# Patient Record
Sex: FEMALE | Race: WHITE | Employment: FULL TIME | URBAN - METROPOLITAN AREA
[De-identification: names, ages, dates, MRNs, and addresses within clinical notes are randomized per-mention and may not be internally consistent; named-entity substitution may affect disease eponyms.]

---

## 2023-12-28 ENCOUNTER — HOSPITAL ENCOUNTER (EMERGENCY)
Facility: HOSPITAL | Age: 33
Discharge: HOME | End: 2023-12-28
Attending: EMERGENCY MEDICINE
Payer: COMMERCIAL

## 2023-12-28 VITALS
HEIGHT: 64 IN | DIASTOLIC BLOOD PRESSURE: 104 MMHG | WEIGHT: 257.28 LBS | OXYGEN SATURATION: 96 % | TEMPERATURE: 97.9 F | SYSTOLIC BLOOD PRESSURE: 174 MMHG | BODY MASS INDEX: 43.92 KG/M2 | RESPIRATION RATE: 18 BRPM | HEART RATE: 73 BPM

## 2023-12-28 DIAGNOSIS — M54.42 ACUTE LEFT-SIDED LOW BACK PAIN WITH LEFT-SIDED SCIATICA: Primary | ICD-10-CM

## 2023-12-28 PROCEDURE — 96372 THER/PROPH/DIAG INJ SC/IM: CPT

## 2023-12-28 PROCEDURE — 6370000000 HC RX 637 (ALT 250 FOR IP): Performed by: EMERGENCY MEDICINE

## 2023-12-28 PROCEDURE — 6360000002 HC RX W HCPCS: Performed by: EMERGENCY MEDICINE

## 2023-12-28 PROCEDURE — 99284 EMERGENCY DEPT VISIT MOD MDM: CPT

## 2023-12-28 RX ORDER — METHOCARBAMOL 750 MG/1
750 TABLET, FILM COATED ORAL 4 TIMES DAILY
Qty: 20 TABLET | Refills: 0 | Status: SHIPPED | OUTPATIENT
Start: 2023-12-28

## 2023-12-28 RX ORDER — LIDOCAINE 4 G/G
1 PATCH TOPICAL DAILY
Qty: 15 PATCH | Refills: 0 | Status: SHIPPED | OUTPATIENT
Start: 2023-12-28

## 2023-12-28 RX ORDER — NAPROXEN 500 MG/1
500 TABLET ORAL 2 TIMES DAILY
Qty: 20 TABLET | Refills: 0 | Status: SHIPPED | OUTPATIENT
Start: 2023-12-28

## 2023-12-28 RX ORDER — LIDOCAINE 4 G/G
1 PATCH TOPICAL
Status: DISCONTINUED | OUTPATIENT
Start: 2023-12-28 | End: 2023-12-28 | Stop reason: HOSPADM

## 2023-12-28 RX ORDER — KETOROLAC TROMETHAMINE 30 MG/ML
30 INJECTION, SOLUTION INTRAMUSCULAR; INTRAVENOUS
Status: COMPLETED | OUTPATIENT
Start: 2023-12-28 | End: 2023-12-28

## 2023-12-28 RX ORDER — CYCLOBENZAPRINE HCL 10 MG
10 TABLET ORAL
Status: COMPLETED | OUTPATIENT
Start: 2023-12-28 | End: 2023-12-28

## 2023-12-28 RX ADMIN — CYCLOBENZAPRINE 10 MG: 10 TABLET, FILM COATED ORAL at 19:11

## 2023-12-28 RX ADMIN — KETOROLAC TROMETHAMINE 30 MG: 30 INJECTION, SOLUTION INTRAMUSCULAR at 19:11

## 2023-12-28 NOTE — ED PROVIDER NOTES
[facility] EMERGENCY DEP  EMERGENCY DEPARTMENT ENCOUNTER      Pt Name: Ree Hammans  MRN: [id number]  [address] [facility] 1990  Date of evaluation: 12/28/2023  Provider: SUSY Lucero    CHIEF COMPLAINT     No chief complaint on file. HISTORY OF PRESENT ILLNESS   (Location/Symptom, Timing/Onset, Context/Setting, Quality, Duration, Modifying Factors, Severity)  Note limiting factors. Ree Hammans is a 29 y.o. female with past medical history as listed below who presents to the emergency department for evaluation of back pain with onset 1 month ago. Has been seen by urgent care x 3 times and was diagnosed with sciatica. Endorses history of sciatica, feels that her pain is similar but more severe than her typical flare-ups. Has attempted treatment with nsaids, muscle relaxers, and prednisone with only temporary relief. Completed course of steroids 2 days ago. Pain is now tingling, burning, and shooting down her left leg. Since that time she has felt like her leg \"gives out\" but has been able to walk. Has never seen orthopedist.  Has had x-rays in the past several weeks since this pain began which showed degenerative disc disease. Nursing Notes were reviewed. REVIEW OF SYSTEMS    (2-9 systems for level 4, 10 or more for level 5)     Review of Systems   Constitutional:  Negative for fever. HENT:  Negative for congestion and sore throat. Eyes:  Negative for visual disturbance. Respiratory:  Negative for cough and shortness of breath. Cardiovascular:  Negative for chest pain. Gastrointestinal:  Negative for abdominal pain, diarrhea and vomiting. Genitourinary:  Negative for dysuria. Musculoskeletal:  Positive for back pain. Negative for neck pain. Skin:  Negative for color change. Neurological:  Negative for dizziness and headaches. Psychiatric/Behavioral:  Negative for confusion.         Except as noted above the remainder of the review of systems was reviewed and

## 2023-12-28 NOTE — DISCHARGE INSTRUCTIONS
Your evaluation, including imaging, were concerning for a musculoskeletal injury today. You may experience some worsening soreness and pain over the next two days, which is normal after any injury to your bone. Continue to take pain medication as directed. Ice rest, and elevate your injured extremity. If you have a splint applied, you should not get this wet and it should only be removed once you have followed up with orthopedics. If you have been given crutches, please use these to avoid weight bearing on your injured leg. If you have been given a sling, please use this as directed when you are active during the day, but not with hygiene activities or when you are sleeping. You should return to the ER if you experience numbness, tingling, weakness, and or other concerning symptoms. You should follow up with orthopedics this week, as you may benefit from their expertise and possible physical therapy in the future. We have likely referred you to a specific orthopedic provider, please call them. If for any reason that we have not referred you to a specific provider,  here is the number for orthopedic groups local for our facilities. Call for an appointment today. [facility]: [phone];  call Monday-Friday: 8 a.m.-5 p.m. [facility] Orth[facility]: [phone];  call Monday-Friday: 8 a.m.-5 p.m.

## 2024-01-08 ENCOUNTER — HOSPITAL ENCOUNTER (OUTPATIENT)
Facility: HOSPITAL | Age: 34
Discharge: HOME | End: 2024-01-11
Attending: ORTHOPAEDIC SURGERY
Payer: COMMERCIAL

## 2024-01-08 DIAGNOSIS — M51.36 DISC DEGENERATION, LUMBAR: ICD-10-CM

## 2024-01-08 DIAGNOSIS — M54.50 LUMBAR PAIN: ICD-10-CM

## 2024-01-08 DIAGNOSIS — M54.16 LUMBAR RADICULOPATHY: ICD-10-CM

## 2024-01-08 DIAGNOSIS — M47.816 FACET ARTHROPATHY, LUMBAR: ICD-10-CM

## 2024-01-08 PROCEDURE — 72148 MRI LUMBAR SPINE W/O DYE: CPT

## 2024-01-16 ENCOUNTER — TRANSCRIBE ORDERS (OUTPATIENT)
Facility: HOSPITAL | Age: 34
End: 2024-01-16

## 2024-01-16 DIAGNOSIS — M54.50 LUMBAR PAIN: ICD-10-CM

## 2024-01-16 DIAGNOSIS — M51.36 DISC DEGENERATION, LUMBAR: ICD-10-CM

## 2024-01-16 DIAGNOSIS — M47.816 FACET ARTHROPATHY, LUMBAR: ICD-10-CM

## 2024-01-16 DIAGNOSIS — M54.16 LUMBAR RADICULOPATHY: Primary | ICD-10-CM

## 2024-01-24 ENCOUNTER — HOSPITAL ENCOUNTER (OUTPATIENT)
Facility: HOSPITAL | Age: 34
Discharge: HOME | End: 2024-01-27
Attending: ORTHOPAEDIC SURGERY
Payer: COMMERCIAL

## 2024-01-24 DIAGNOSIS — M54.16 LUMBAR RADICULOPATHY: ICD-10-CM

## 2024-01-24 DIAGNOSIS — M51.36 LUMBAR DEGENERATIVE DISC DISEASE: ICD-10-CM

## 2024-01-24 DIAGNOSIS — M51.36 DISC DEGENERATION, LUMBAR: ICD-10-CM

## 2024-01-24 DIAGNOSIS — M54.50 LUMBAR PAIN: ICD-10-CM

## 2024-01-24 DIAGNOSIS — M47.816 FACET ARTHROPATHY, LUMBAR: ICD-10-CM

## 2024-01-24 DIAGNOSIS — M62.830 BACK MUSCLE SPASM: ICD-10-CM

## 2024-01-24 DIAGNOSIS — M54.50 LUMBAR SPINE PAIN: ICD-10-CM

## 2024-01-24 PROCEDURE — 72125 CT NECK SPINE W/O DYE: CPT

## 2024-01-24 PROCEDURE — 72131 CT LUMBAR SPINE W/O DYE: CPT
